# Patient Record
Sex: FEMALE | Race: BLACK OR AFRICAN AMERICAN | Employment: FULL TIME | URBAN - METROPOLITAN AREA
[De-identification: names, ages, dates, MRNs, and addresses within clinical notes are randomized per-mention and may not be internally consistent; named-entity substitution may affect disease eponyms.]

---

## 2023-07-11 ENCOUNTER — OFFICE VISIT (OUTPATIENT)
Dept: OCCUPATIONAL MEDICINE | Age: 28
End: 2023-07-11

## 2023-07-11 DIAGNOSIS — J06.9 URI, ACUTE: Primary | ICD-10-CM

## 2023-07-11 LAB
INFLUENZA A ANTIGEN, POC: NEGATIVE
INFLUENZA B ANTIGEN, POC: NEGATIVE
SARS-COV-2 RNA, POC: NEGATIVE

## 2023-07-12 VITALS
HEART RATE: 75 BPM | TEMPERATURE: 99.2 F | DIASTOLIC BLOOD PRESSURE: 76 MMHG | SYSTOLIC BLOOD PRESSURE: 118 MMHG | OXYGEN SATURATION: 98 % | RESPIRATION RATE: 16 BRPM

## 2023-07-12 PROBLEM — G43.909 MIGRAINE: Status: ACTIVE | Noted: 2022-08-05

## 2023-07-12 PROBLEM — J30.9 ALLERGIC RHINITIS: Status: ACTIVE | Noted: 2022-04-04

## 2023-07-12 PROBLEM — M79.671 FOOT PAIN, RIGHT: Status: ACTIVE | Noted: 2019-01-24

## 2023-07-12 PROBLEM — F51.01 PRIMARY INSOMNIA: Status: ACTIVE | Noted: 2022-08-05

## 2023-07-12 PROBLEM — F41.9 ANXIETY: Status: ACTIVE | Noted: 2022-09-16

## 2023-07-12 RX ORDER — NORGESTIMATE AND ETHINYL ESTRADIOL 7DAYSX3 28
1 KIT ORAL DAILY
COMMUNITY
Start: 2023-05-07

## 2023-07-12 RX ORDER — VALACYCLOVIR HYDROCHLORIDE 500 MG/1
500 TABLET, FILM COATED ORAL DAILY
COMMUNITY
Start: 2023-06-26

## 2023-07-12 RX ORDER — ESCITALOPRAM OXALATE 10 MG/1
TABLET ORAL
COMMUNITY
Start: 2023-06-26

## 2023-07-12 RX ORDER — EPINEPHRINE 0.3 MG/.3ML
0.3 INJECTION SUBCUTANEOUS
COMMUNITY
Start: 2023-06-09

## 2023-07-12 ASSESSMENT — ENCOUNTER SYMPTOMS
SWOLLEN GLANDS: 0
NAUSEA: 0
SORE THROAT: 1
ABDOMINAL PAIN: 0
COUGH: 0
CHANGE IN BOWEL HABIT: 0
VISUAL CHANGE: 0
VOMITING: 0

## 2023-07-12 NOTE — PROGRESS NOTES
PROGRESS NOTE    SUBJECTIVE:   Cici Hatch is a 28 y.o. female seen for a visit regarding sore throat    States she has had left ear pain and sore throat x 3 days. Denies any sick contacts. Pharyngitis  This is a new problem. The current episode started in the past 7 days. The problem occurs intermittently. The problem has been waxing and waning. Associated symptoms include fatigue and a sore throat. Pertinent negatives include no abdominal pain, anorexia, arthralgias, change in bowel habit, chest pain, chills, congestion, coughing, diaphoresis, fever, headaches, joint swelling, myalgias, nausea, neck pain, numbness, rash, swollen glands, urinary symptoms, vertigo, visual change, vomiting or weakness. Associated symptoms comments: She also has left ear pain. Nothing aggravates the symptoms. She has tried nothing for the symptoms. Review of Systems   Constitutional: Positive for fatigue. Negative for chills, diaphoresis and fever. HENT:  Positive for sore throat. Negative for congestion. Cardiovascular:  Negative for chest pain. Respiratory:  Negative for cough. Skin:  Negative for rash. Musculoskeletal:  Negative for arthralgias, joint swelling, myalgias and neck pain. Gastrointestinal:  Negative for abdominal pain, anorexia, change in bowel habit, nausea and vomiting. Neurological:  Negative for headaches, numbness, vertigo and weakness. Current Outpatient Medications   Medication Sig Dispense Refill    EPINEPHrine (EPIPEN) 0.3 MG/0.3ML SOAJ injection Inject 0.3 mLs into the muscle once as needed      Ubrogepant 50 MG TABS Take 50 mg by mouth daily as needed      Melatonin-SYLVIA-Valerian 6-30-50 MG CAPS Take by mouth      doxyLAMINE succinate (GNP SLEEP AID) 25 MG tablet by Oral/Gastric Tube route      escitalopram (LEXAPRO) 10 MG tablet TAKE 1 TABLET (10 MG) BY MOUTH DAILY.       TRI-ESTARYLLA 0.18/0.215/0.25 MG-35 MCG TABS Take 1 tablet by mouth daily      valACYclovir (VALTREX) 500

## 2023-08-31 ENCOUNTER — OFFICE VISIT (OUTPATIENT)
Dept: OCCUPATIONAL MEDICINE | Age: 28
End: 2023-08-31

## 2023-08-31 VITALS
SYSTOLIC BLOOD PRESSURE: 120 MMHG | HEART RATE: 105 BPM | DIASTOLIC BLOOD PRESSURE: 80 MMHG | TEMPERATURE: 98.4 F | RESPIRATION RATE: 16 BRPM | OXYGEN SATURATION: 99 %

## 2023-08-31 DIAGNOSIS — J02.9 SORE THROAT: ICD-10-CM

## 2023-08-31 DIAGNOSIS — J06.9 URI, ACUTE: Primary | ICD-10-CM

## 2023-08-31 LAB
GROUP A STREP ANTIGEN, POC: NEGATIVE
INFLUENZA A ANTIGEN, POC: NEGATIVE
INFLUENZA B ANTIGEN, POC: NEGATIVE
SARS-COV-2, POC: NORMAL
VALID INTERNAL CONTROL, POC: YES
VALID INTERNAL CONTROL, POC: YES

## 2023-08-31 ASSESSMENT — ENCOUNTER SYMPTOMS
VOMITING: 0
COUGH: 0
SINUS PAIN: 1
ABDOMINAL PAIN: 0
NAUSEA: 1
RHINORRHEA: 1
DIARRHEA: 0
WHEEZING: 0
SORE THROAT: 1
SWOLLEN GLANDS: 0